# Patient Record
Sex: MALE | Race: WHITE | Employment: STUDENT | URBAN - METROPOLITAN AREA
[De-identification: names, ages, dates, MRNs, and addresses within clinical notes are randomized per-mention and may not be internally consistent; named-entity substitution may affect disease eponyms.]

---
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
## 2023-11-09 ENCOUNTER — HOSPITAL ENCOUNTER (EMERGENCY)
Age: 18
Discharge: HOME | End: 2023-11-09
Attending: EMERGENCY MEDICINE
Payer: COMMERCIAL

## 2023-11-09 VITALS
BODY MASS INDEX: 37.93 KG/M2 | WEIGHT: 280 LBS | OXYGEN SATURATION: 96 % | HEART RATE: 92 BPM | TEMPERATURE: 98 F | RESPIRATION RATE: 18 BRPM | SYSTOLIC BLOOD PRESSURE: 121 MMHG | HEIGHT: 72 IN | DIASTOLIC BLOOD PRESSURE: 89 MMHG

## 2023-11-09 DIAGNOSIS — S01.25XA DOG BITE OF NOSE, INITIAL ENCOUNTER: Primary | ICD-10-CM
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
## 2023-11-09 PROCEDURE — 99283 EMERGENCY DEPT VISIT LOW MDM: CPT

## 2023-11-09 RX ORDER — LISDEXAMFETAMINE DIMESYLATE CAPSULES 30 MG/1
30 CAPSULE ORAL EVERY MORNING
COMMUNITY

## 2023-11-09 RX ORDER — AMOXICILLIN AND CLAVULANATE POTASSIUM 875; 125 MG/1; MG/1
1 TABLET, FILM COATED ORAL 2 TIMES DAILY
Qty: 20 TABLET | Refills: 0 | Status: SHIPPED | OUTPATIENT
Start: 2023-11-09 | End: 2023-11-19

## 2023-11-09 RX ORDER — AMOXICILLIN AND CLAVULANATE POTASSIUM 875; 125 MG/1; MG/1
1 TABLET, FILM COATED ORAL ONCE
Status: COMPLETED | OUTPATIENT
Start: 2023-11-09 | End: 2023-11-09

## 2023-11-09 NOTE — ED INITIAL ASSESSMENT (HPI)
Patient here with dog bite to nose, states it was his dog. Bleeding controlled. Denies other injury.

## (undated) NOTE — IP AVS SNAPSHOT
[facility] [place] [address] [place], [address] ~ [phone]                Discharge Summary   5/25/2017    Shon Ocampo           Admission Information        Provider Department    5/25/2017 Hao Guzman MD  1se-B IPV 7/27/2015, 11/9/2004, 6/23/2003, 4/23/2003    MENINGOCOCCAL 7/27/2015    MMR 2/21/2008, 6/18/2004    Pneumococcal (Prevnar 7) 11/9/2004, 8/25/2003, 6/23/2003, 4/23/2003    TDAP 3/14/2014    VARICELLA 2/21/2008, 2/18/2004      Recent Hematology Lab Res - If you have concerns related to behavioral health issues or thoughts of harming yourself, contact [address] [place] [address] at [phone].     - If you don’t have insurance, [facility] has partnered with Patient [facility] Frances

## (undated) NOTE — ED AVS SNAPSHOT
Reji Pate   MRN: [id number]    Department:  [address] in [place]   Date of Visit:  4/16/2018           Disclosure     Insurance plans vary and the physician(s) referred by the ER may not be covered by your plan.  Please conta tell this physician (or your personal doctor if your instructions are to return to your personal doctor) about any new or lasting problems. The primary care or specialist physician will see patients referred from the [facility] Emergency Department.  Ellis Paulson

## (undated) NOTE — LETTER
Date & Time: 4/16/2018, 1:16 PM  Patient: Smooth Estrella  Encounter Provider(s):    MD Chantale Valverde [place]       To Whom It May Concern:    Sreekanth Andujar was seen and treated in our department on 4/16/2018.  He should not particip